# Patient Record
Sex: FEMALE
[De-identification: names, ages, dates, MRNs, and addresses within clinical notes are randomized per-mention and may not be internally consistent; named-entity substitution may affect disease eponyms.]

---

## 2023-10-06 PROBLEM — Z00.00 ENCOUNTER FOR PREVENTIVE HEALTH EXAMINATION: Status: ACTIVE | Noted: 2023-10-06

## 2023-10-09 ENCOUNTER — APPOINTMENT (OUTPATIENT)
Dept: UROLOGY | Facility: CLINIC | Age: 53
End: 2023-10-09

## 2024-03-06 ENCOUNTER — APPOINTMENT (OUTPATIENT)
Dept: UROLOGY | Facility: CLINIC | Age: 54
End: 2024-03-06
Payer: COMMERCIAL

## 2024-03-06 VITALS
SYSTOLIC BLOOD PRESSURE: 132 MMHG | OXYGEN SATURATION: 99 % | TEMPERATURE: 97.2 F | HEIGHT: 67 IN | BODY MASS INDEX: 25.11 KG/M2 | HEART RATE: 58 BPM | DIASTOLIC BLOOD PRESSURE: 75 MMHG | WEIGHT: 160 LBS

## 2024-03-06 DIAGNOSIS — F17.200 NICOTINE DEPENDENCE, UNSPECIFIED, UNCOMPLICATED: ICD-10-CM

## 2024-03-06 DIAGNOSIS — Z78.9 OTHER SPECIFIED HEALTH STATUS: ICD-10-CM

## 2024-03-06 DIAGNOSIS — R31.0 GROSS HEMATURIA: ICD-10-CM

## 2024-03-06 LAB
BILIRUB UR QL STRIP: NORMAL
CLARITY UR: CLEAR
COLLECTION METHOD: NORMAL
GLUCOSE UR-MCNC: NORMAL
HCG UR QL: 0.2 EU/DL
HGB UR QL STRIP.AUTO: NORMAL
KETONES UR-MCNC: NORMAL
LEUKOCYTE ESTERASE UR QL STRIP: NORMAL
NITRITE UR QL STRIP: POSITIVE
PH UR STRIP: 5.5
PROT UR STRIP-MCNC: NORMAL
SP GR UR STRIP: >=1.03

## 2024-03-06 PROCEDURE — 81003 URINALYSIS AUTO W/O SCOPE: CPT | Mod: QW

## 2024-03-06 PROCEDURE — 99204 OFFICE O/P NEW MOD 45 MIN: CPT

## 2024-03-07 LAB
APPEARANCE: CLEAR
BACTERIA: ABNORMAL /HPF
BILIRUBIN URINE: NEGATIVE
BLOOD URINE: ABNORMAL
CAST: 0 /LPF
COLOR: YELLOW
EPITHELIAL CELLS: 2 /HPF
GLUCOSE QUALITATIVE U: NEGATIVE MG/DL
KETONES URINE: NEGATIVE MG/DL
LEUKOCYTE ESTERASE URINE: NEGATIVE
MICROSCOPIC-UA: NORMAL
NITRITE URINE: POSITIVE
PH URINE: 5.5
PROTEIN URINE: NEGATIVE MG/DL
RED BLOOD CELLS URINE: 10 /HPF
SPECIFIC GRAVITY URINE: 1.02
URINE CYTOLOGY: NORMAL
UROBILINOGEN URINE: 1 MG/DL
WHITE BLOOD CELLS URINE: 2 /HPF

## 2024-03-07 NOTE — ASSESSMENT
[FreeTextEntry1] : I discussed the findings and options with Ms. VONNIE GARZA in detail.  We discussed the etiology and implications of recurrent asymptomatic microhematuria vs gross hematuria in great detail.  We reviewed the standard evaluation for microscopic hematuria. She understands that if UAx revealed microhematuria (>3RBC), she will need additional testing (i.e., imaging of the kidneys, followed by an in-office cystoscopy).  I have described both procedures in detail.  Given her smoking history and recent episode of gross hematuria, I have offered her cystoscopy to further investigate. The procedure was discussed in great detail with their risks and benefits.  - CT urogram w/wo cont ordered today, and the appropriate referral/requisition was provided.  - Urine was sent for culture, analysis, cytology, and we will call her if either result is positive.    Pt will plan to return in office for cystoscopy. Patient expressed understanding.     The total amount of time I have personally spent preparing for this visit, reviewing the patient's test results, obtaining external history, ordering tests/medications, documenting clinical information, communicating with and counseling the patient/family and/or caregiver(s), reviewing old records, and spent face to face with the patient explaining the above was 45 minutes.

## 2024-03-07 NOTE — HISTORY OF PRESENT ILLNESS
[FreeTextEntry1] : 2024 -- Pt is a 54 yo F  (VD) who was referred by PCP for microscopic hematuria consultation.   Pt verbalizes episode of ?gross hematuria vs vaginal spotting/ "little drop of blood" that started 2 months ago.   Pt reports long-standing bilateral back pain- "likely musculoskeletal"  as per pt. Pt reports malodorous urine. Otherwise, she has no irritative voiding symptoms. She has not had any recent UTIs. Denies dysuria, fever/chills, no freq or urgency.  She reports FHx renal stones (mother).  Sexually active, denies dyspareunia. Menopause >2years ago. Denies PSH /pelvic.    PMH: n/a  PSH: n/a  FH: renal stone (mother) SocHx: active smoker (1-2 cigarette), social alcohol Meds: n/a Allergies: NKDA

## 2024-03-07 NOTE — ADDENDUM
[FreeTextEntry1] : A portion of this note was written by [Tyrell Ramesh] on 03/06/2024 acting as a scribe for Dr. Motta.   I have personally reviewed the chart and agree that the record accurately reflects my personal performance of the history, physical exam, assessment, and plan.

## 2024-03-11 DIAGNOSIS — N39.0 URINARY TRACT INFECTION, SITE NOT SPECIFIED: ICD-10-CM

## 2024-03-11 LAB — BACTERIA UR CULT: ABNORMAL

## 2024-03-11 RX ORDER — NITROFURANTOIN (MONOHYDRATE/MACROCRYSTALS) 25; 75 MG/1; MG/1
100 CAPSULE ORAL TWICE DAILY
Qty: 10 | Refills: 0 | Status: ACTIVE | COMMUNITY
Start: 2024-03-11 | End: 1900-01-01

## 2024-04-25 ENCOUNTER — APPOINTMENT (OUTPATIENT)
Dept: UROLOGY | Facility: CLINIC | Age: 54
End: 2024-04-25

## 2024-04-25 NOTE — HISTORY OF PRESENT ILLNESS
[FreeTextEntry1] : 2024 -- Pt is a 54 yo F  (VD) who was referred by PCP for microscopic hematuria. She presents today for a cystoscopy. 3/6/24 UA showed moderate blood, positive nitrite, 10/HPF of RBC, and moderate/HPF of bacteria. Cytology is negative for high grade urothelial carcinoma. Culture showed >100,000 CFU/ml Escherichia coli.        2024 -- Pt is a 54 yo F  (VD) who was referred by PCP for microscopic hematuria consultation.   Pt verbalizes episode of ?gross hematuria vs vaginal spotting/ "little drop of blood" that started 2 months ago.   Pt reports long-standing bilateral back pain- "likely musculoskeletal"  as per pt. Pt reports malodorous urine. Otherwise, she has no irritative voiding symptoms. She has not had any recent UTIs. Denies dysuria, fever/chills, no freq or urgency.  She reports FHx renal stones (mother).  Sexually active, denies dyspareunia. Menopause >2years ago. Denies PSH /pelvic.    PMH: n/a  PSH: n/a  FH: renal stone (mother) SocHx: active smoker (1-2 cigarette), social alcohol Meds: n/a Allergies: NKDA

## 2024-04-25 NOTE — ASSESSMENT
[FreeTextEntry1] : Ms. VONNIE GARZA presents today for a cystoscopy.     I discussed the findings and options with Ms. VONNIE GARZA in detail. 3/6/24 UA showed moderate blood, positive nitrite, 10/HPF of RBC, and moderate/HPF of bacteria. Cytology is negative for high grade urothelial carcinoma. Culture showed >100,000 CFU/ml Escherichia coli.        Patient expressed understanding.     The total amount of time I have personally spent preparing for this visit, reviewing the patient's test results, obtaining external history, ordering tests/medications, documenting clinical information, communicating with and counseling the patient/family and/or caregiver(s), reviewing old records, and spent face to face with the patient explaining the above was X minutes.

## 2024-04-25 NOTE — ADDENDUM
[FreeTextEntry1] : A portion of this note was written by Jenifer Atkinson on 04/25/2024 acting as a scribe for Dr. Motta.   I have personally reviewed the chart and agree that the record accurately reflects my personal performance of the history, physical exam, assessment, and plan.